# Patient Record
Sex: MALE | URBAN - METROPOLITAN AREA
[De-identification: names, ages, dates, MRNs, and addresses within clinical notes are randomized per-mention and may not be internally consistent; named-entity substitution may affect disease eponyms.]

---

## 2020-09-28 ENCOUNTER — HOSPITAL ENCOUNTER (OUTPATIENT)
Dept: TELEMEDICINE | Facility: HOSPITAL | Age: 65
Discharge: HOME | End: 2020-09-28

## 2020-09-28 PROCEDURE — 99499 UNLISTED E&M SERVICE: CPT | Mod: GT,,, | Performed by: PSYCHIATRY & NEUROLOGY

## 2020-09-28 PROCEDURE — 99499 NO LOS: ICD-10-PCS | Mod: GT,,, | Performed by: PSYCHIATRY & NEUROLOGY

## 2020-09-28 NOTE — CONSULTS
Started the audio the consultation the machine failed.  Spoke with Dr. Orellana by phone.  Discussed the risk and benefits of tPA.  It advised Dr. Orellana to go ahead and administer tPA based on his examination.

## 2020-09-28 NOTE — CONSULTS
Audiovisual portion of the consultation was done and the patient was being interviewed with ja ca.  Spoke with Dr. Orellana by phone.  Discussed the risks and benefits of tPA.  I advised Dr. Orellana to go ahead and administer tPA based on his examination.